# Patient Record
Sex: FEMALE | Race: WHITE | Employment: OTHER | URBAN - METROPOLITAN AREA
[De-identification: names, ages, dates, MRNs, and addresses within clinical notes are randomized per-mention and may not be internally consistent; named-entity substitution may affect disease eponyms.]

---

## 2018-01-01 ENCOUNTER — APPOINTMENT (OUTPATIENT)
Dept: ULTRASOUND IMAGING | Facility: HOSPITAL | Age: 68
DRG: 871 | End: 2018-01-01
Attending: PHYSICIAN ASSISTANT
Payer: MEDICARE

## 2018-01-01 ENCOUNTER — HOSPITAL ENCOUNTER (INPATIENT)
Facility: HOSPITAL | Age: 68
LOS: 3 days | Discharge: HOME | DRG: 687 | End: 2018-01-01
Attending: EMERGENCY MEDICINE | Admitting: INTERNAL MEDICINE
Payer: MEDICARE

## 2018-01-01 ENCOUNTER — HOSPITAL ENCOUNTER (INPATIENT)
Facility: HOSPITAL | Age: 68
LOS: 1 days | DRG: 871 | End: 2018-01-01
Attending: EMERGENCY MEDICINE | Admitting: INTERNAL MEDICINE
Payer: MEDICARE

## 2018-01-01 ENCOUNTER — APPOINTMENT (OUTPATIENT)
Dept: CT IMAGING | Facility: HOSPITAL | Age: 68
DRG: 871 | End: 2018-01-01
Attending: PHYSICIAN ASSISTANT
Payer: MEDICARE

## 2018-01-01 ENCOUNTER — APPOINTMENT (OUTPATIENT)
Dept: ULTRASOUND IMAGING | Facility: HOSPITAL | Age: 68
DRG: 687 | End: 2018-01-01
Attending: EMERGENCY MEDICINE
Payer: MEDICARE

## 2018-01-01 ENCOUNTER — APPOINTMENT (OUTPATIENT)
Dept: CT IMAGING | Facility: HOSPITAL | Age: 68
DRG: 687 | End: 2018-01-01
Attending: INTERNAL MEDICINE
Payer: MEDICARE

## 2018-01-01 ENCOUNTER — APPOINTMENT (OUTPATIENT)
Dept: CT IMAGING | Facility: HOSPITAL | Age: 68
DRG: 687 | End: 2018-01-01
Attending: EMERGENCY MEDICINE
Payer: MEDICARE

## 2018-01-01 ENCOUNTER — APPOINTMENT (OUTPATIENT)
Dept: HEMATOLOGY/ONCOLOGY | Facility: HOSPITAL | Age: 68
End: 2018-01-01
Attending: INTERNAL MEDICINE
Payer: MEDICARE

## 2018-01-01 ENCOUNTER — APPOINTMENT (OUTPATIENT)
Dept: INTERVENTIONAL RADIOLOGY/VASCULAR | Facility: HOSPITAL | Age: 68
DRG: 687 | End: 2018-01-01
Attending: CLINICAL NURSE SPECIALIST
Payer: MEDICARE

## 2018-01-01 ENCOUNTER — APPOINTMENT (OUTPATIENT)
Dept: GENERAL RADIOLOGY | Facility: HOSPITAL | Age: 68
DRG: 871 | End: 2018-01-01
Attending: EMERGENCY MEDICINE
Payer: MEDICARE

## 2018-01-01 ENCOUNTER — APPOINTMENT (OUTPATIENT)
Dept: GENERAL RADIOLOGY | Facility: HOSPITAL | Age: 68
DRG: 687 | End: 2018-01-01
Attending: EMERGENCY MEDICINE
Payer: MEDICARE

## 2018-01-01 ENCOUNTER — APPOINTMENT (OUTPATIENT)
Dept: CT IMAGING | Facility: HOSPITAL | Age: 68
DRG: 871 | End: 2018-01-01
Attending: EMERGENCY MEDICINE
Payer: MEDICARE

## 2018-01-01 VITALS — TEMPERATURE: 97 F | WEIGHT: 179.31 LBS | OXYGEN SATURATION: 95 % | BODY MASS INDEX: 31 KG/M2

## 2018-01-01 VITALS
HEIGHT: 64 IN | WEIGHT: 170 LBS | TEMPERATURE: 99 F | RESPIRATION RATE: 24 BRPM | BODY MASS INDEX: 29.02 KG/M2 | HEART RATE: 85 BPM | SYSTOLIC BLOOD PRESSURE: 145 MMHG | OXYGEN SATURATION: 92 % | DIASTOLIC BLOOD PRESSURE: 70 MMHG

## 2018-01-01 DIAGNOSIS — J18.9 HAP (HOSPITAL-ACQUIRED PNEUMONIA): ICD-10-CM

## 2018-01-01 DIAGNOSIS — D64.9 ANEMIA, UNSPECIFIED TYPE: ICD-10-CM

## 2018-01-01 DIAGNOSIS — R53.81 MALAISE: ICD-10-CM

## 2018-01-01 DIAGNOSIS — C64.2 RENAL CELL CARCINOMA OF LEFT KIDNEY METASTATIC TO OTHER SITE (HCC): Primary | ICD-10-CM

## 2018-01-01 DIAGNOSIS — E87.1 HYPONATREMIA: ICD-10-CM

## 2018-01-01 DIAGNOSIS — N30.01 ACUTE CYSTITIS WITH HEMATURIA: ICD-10-CM

## 2018-01-01 DIAGNOSIS — D72.829 LEUKOCYTOSIS, UNSPECIFIED TYPE: ICD-10-CM
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
## 2018-01-01 DIAGNOSIS — A41.9 SEPSIS, DUE TO UNSPECIFIED ORGANISM: Primary | ICD-10-CM

## 2018-01-01 DIAGNOSIS — N17.9 AKI (ACUTE KIDNEY INJURY) (HCC): ICD-10-CM

## 2018-01-01 DIAGNOSIS — E83.42 HYPOMAGNESEMIA: ICD-10-CM

## 2018-01-01 DIAGNOSIS — R59.0 SUPRACLAVICULAR LYMPHADENOPATHY: ICD-10-CM

## 2018-01-01 LAB
ALBUMIN SERPL BCP-MCNC: 1.5 G/DL (ref 3.5–4.8)
ALBUMIN SERPL BCP-MCNC: 1.8 G/DL (ref 3.5–4.8)
ALBUMIN SERPL BCP-MCNC: 2.1 G/DL (ref 3.5–4.8)
ALBUMIN SERPL BCP-MCNC: 2.3 G/DL (ref 3.5–4.8)
ALBUMIN SERPL BCP-MCNC: 2.8 G/DL (ref 3.5–4.8)
ALBUMIN/GLOB SERPL: 0.5 {RATIO} (ref 1–2)
ALP SERPL-CCNC: 162 U/L (ref 32–100)
ALP SERPL-CCNC: 174 U/L (ref 32–100)
ALP SERPL-CCNC: 188 U/L (ref 32–100)
ALP SERPL-CCNC: 645 U/L (ref 32–100)
ALP SERPL-CCNC: 708 U/L (ref 32–100)
ALT SERPL-CCNC: 132 U/L (ref 14–54)
ALT SERPL-CCNC: 620 U/L (ref 14–54)
ALT SERPL-CCNC: 708 U/L (ref 14–54)
ALT SERPL-CCNC: 78 U/L (ref 14–54)
ALT SERPL-CCNC: 94 U/L (ref 14–54)
AMMONIA PLAS-MCNC: 153 UG/DL (ref 19.5–64.6)
ANION GAP SERPL CALC-SCNC: 10 MMOL/L (ref 0–18)
ANION GAP SERPL CALC-SCNC: 10 MMOL/L (ref 0–18)
ANION GAP SERPL CALC-SCNC: 13 MMOL/L (ref 0–18)
ANION GAP SERPL CALC-SCNC: 15 MMOL/L (ref 0–18)
ANION GAP SERPL CALC-SCNC: 9 MMOL/L (ref 0–18)
APTT PPP: 31.7 SECONDS (ref 23.2–35.3)
AST SERPL-CCNC: 103 U/L (ref 15–41)
AST SERPL-CCNC: 1362 U/L (ref 15–41)
AST SERPL-CCNC: 151 U/L (ref 15–41)
AST SERPL-CCNC: 1716 U/L (ref 15–41)
AST SERPL-CCNC: 225 U/L (ref 15–41)
BASOPHILS # BLD: 0 K/UL (ref 0–0.2)
BASOPHILS # BLD: 0 K/UL (ref 0–0.2)
BASOPHILS # BLD: 0.1 K/UL (ref 0–0.2)
BASOPHILS NFR BLD: 0 %
BASOPHILS NFR BLD: 1 %
BILIRUB DIRECT SERPL-MCNC: 0.5 MG/DL (ref 0–0.2)
BILIRUB DIRECT SERPL-MCNC: 0.8 MG/DL (ref 0–0.2)
BILIRUB DIRECT SERPL-MCNC: 6.8 MG/DL (ref 0–0.2)
BILIRUB DIRECT SERPL-MCNC: 7.2 MG/DL (ref 0–0.2)
BILIRUB SERPL-MCNC: 1 MG/DL (ref 0.3–1.2)
BILIRUB SERPL-MCNC: 1.1 MG/DL (ref 0.3–1.2)
BILIRUB SERPL-MCNC: 1.4 MG/DL (ref 0.3–1.2)
BILIRUB SERPL-MCNC: 10.2 MG/DL (ref 0.3–1.2)
BILIRUB SERPL-MCNC: 11.2 MG/DL (ref 0.3–1.2)
BILIRUB UR QL: NEGATIVE
BILIRUB UR QL: POSITIVE
BUN SERPL-MCNC: 10 MG/DL (ref 8–20)
BUN SERPL-MCNC: 10 MG/DL (ref 8–20)
BUN SERPL-MCNC: 11 MG/DL (ref 8–20)
BUN SERPL-MCNC: 63 MG/DL (ref 8–20)
BUN SERPL-MCNC: 70 MG/DL (ref 8–20)
BUN/CREAT SERPL: 13 (ref 10–20)
BUN/CREAT SERPL: 14.3 (ref 10–20)
BUN/CREAT SERPL: 14.7 (ref 10–20)
BUN/CREAT SERPL: 34.4 (ref 10–20)
BUN/CREAT SERPL: 38.5 (ref 10–20)
CALCIUM SERPL-MCNC: 6.6 MG/DL (ref 8.5–10.5)
CALCIUM SERPL-MCNC: 7.1 MG/DL (ref 8.5–10.5)
CALCIUM SERPL-MCNC: 8 MG/DL (ref 8.5–10.5)
CALCIUM SERPL-MCNC: 8.2 MG/DL (ref 8.5–10.5)
CALCIUM SERPL-MCNC: 8.8 MG/DL (ref 8.5–10.5)
CHLORIDE SERPL-SCNC: 102 MMOL/L (ref 95–110)
CHLORIDE SERPL-SCNC: 107 MMOL/L (ref 95–110)
CHLORIDE SERPL-SCNC: 94 MMOL/L (ref 95–110)
CHLORIDE SERPL-SCNC: 95 MMOL/L (ref 95–110)
CHLORIDE SERPL-SCNC: 99 MMOL/L (ref 95–110)
CLARITY UR: CLEAR
CO2 SERPL-SCNC: 16 MMOL/L (ref 22–32)
CO2 SERPL-SCNC: 19 MMOL/L (ref 22–32)
CO2 SERPL-SCNC: 20 MMOL/L (ref 22–32)
CO2 SERPL-SCNC: 23 MMOL/L (ref 22–32)
CO2 SERPL-SCNC: 23 MMOL/L (ref 22–32)
COLOR UR: YELLOW
COLOR UR: YELLOW
CREAT SERPL-MCNC: 0.7 MG/DL (ref 0.5–1.5)
CREAT SERPL-MCNC: 0.75 MG/DL (ref 0.5–1.5)
CREAT SERPL-MCNC: 0.77 MG/DL (ref 0.5–1.5)
CREAT SERPL-MCNC: 1.82 MG/DL (ref 0.5–1.5)
CREAT SERPL-MCNC: 1.83 MG/DL (ref 0.5–1.5)
EOSINOPHIL # BLD: 0 K/UL (ref 0–0.7)
EOSINOPHIL # BLD: 0 K/UL (ref 0–0.7)
EOSINOPHIL # BLD: 0.1 K/UL (ref 0–0.7)
EOSINOPHIL NFR BLD: 0 %
EOSINOPHIL NFR BLD: 0 %
EOSINOPHIL NFR BLD: 1 %
ERYTHROCYTE [DISTWIDTH] IN BLOOD BY AUTOMATED COUNT: 15.5 % (ref 11–15)
ERYTHROCYTE [DISTWIDTH] IN BLOOD BY AUTOMATED COUNT: 15.6 % (ref 11–15)
ERYTHROCYTE [DISTWIDTH] IN BLOOD BY AUTOMATED COUNT: 15.9 % (ref 11–15)
ERYTHROCYTE [DISTWIDTH] IN BLOOD BY AUTOMATED COUNT: 17.7 % (ref 11–15)
ERYTHROCYTE [DISTWIDTH] IN BLOOD BY AUTOMATED COUNT: 20.1 % (ref 11–15)
GLOBULIN PLAS-MCNC: 4.3 G/DL (ref 2.5–3.7)
GLUCOSE SERPL-MCNC: 102 MG/DL (ref 70–99)
GLUCOSE SERPL-MCNC: 102 MG/DL (ref 70–99)
GLUCOSE SERPL-MCNC: 104 MG/DL (ref 70–99)
GLUCOSE SERPL-MCNC: 123 MG/DL (ref 70–99)
GLUCOSE SERPL-MCNC: 81 MG/DL (ref 70–99)
GLUCOSE UR-MCNC: NEGATIVE MG/DL
GLUCOSE UR-MCNC: NEGATIVE MG/DL
HAV IGM SERPL QL IA: NONREACTIVE
HBV CORE IGM SERPL QL IA: NONREACTIVE
HBV SURFACE AG SERPL QL IA: NONREACTIVE
HCT VFR BLD AUTO: 28.4 % (ref 35–48)
HCT VFR BLD AUTO: 29 % (ref 35–48)
HCT VFR BLD AUTO: 31.6 % (ref 35–48)
HCT VFR BLD AUTO: 33.3 % (ref 35–48)
HCT VFR BLD AUTO: 34.6 % (ref 35–48)
HCV AB SERPL QL IA: NONREACTIVE
HGB BLD-MCNC: 10.3 G/DL (ref 12–16)
HGB BLD-MCNC: 10.9 G/DL (ref 12–16)
HGB BLD-MCNC: 9.1 G/DL (ref 12–16)
HGB BLD-MCNC: 9.4 G/DL (ref 12–16)
HGB BLD-MCNC: 9.4 G/DL (ref 12–16)
HYALINE CASTS #/AREA URNS AUTO: 1 /LPF
INR BLD: 1.2 (ref 0.9–1.2)
INR BLD: 2.3 (ref 0.9–1.2)
IRON SATN MFR SERPL: 10 % (ref 15–50)
IRON SERPL-MCNC: 20 MCG/DL (ref 28–170)
KETONES UR-MCNC: NEGATIVE MG/DL
KETONES UR-MCNC: NEGATIVE MG/DL
LACTATE SERPL-SCNC: 1.8 MMOL/L (ref 0.5–2.2)
LACTATE SERPL-SCNC: 2.4 MMOL/L (ref 0.5–2.2)
LACTATE SERPL-SCNC: 5.3 MMOL/L (ref 0.5–2.2)
LEUKOCYTE ESTERASE UR QL STRIP.AUTO: NEGATIVE
LIPASE SERPL-CCNC: 14 U/L (ref 22–51)
LIPASE SERPL-CCNC: 20 U/L (ref 22–51)
LYMPHOCYTES # BLD: 0.8 K/UL (ref 1–4)
LYMPHOCYTES # BLD: 0.9 K/UL (ref 1–4)
LYMPHOCYTES # BLD: 2 K/UL (ref 1–4)
LYMPHOCYTES NFR BLD: 2 %
LYMPHOCYTES NFR BLD: 4 %
LYMPHOCYTES NFR BLD: 5 %
MAGNESIUM SERPL-MCNC: 1.7 MG/DL (ref 1.8–2.5)
MCH RBC QN AUTO: 24.4 PG (ref 27–32)
MCH RBC QN AUTO: 24.6 PG (ref 27–32)
MCH RBC QN AUTO: 24.7 PG (ref 27–32)
MCH RBC QN AUTO: 25.2 PG (ref 27–32)
MCH RBC QN AUTO: 25.4 PG (ref 27–32)
MCHC RBC AUTO-ENTMCNC: 28.1 G/DL (ref 32–37)
MCHC RBC AUTO-ENTMCNC: 31.7 G/DL (ref 32–37)
MCHC RBC AUTO-ENTMCNC: 32.2 G/DL (ref 32–37)
MCHC RBC AUTO-ENTMCNC: 32.3 G/DL (ref 32–37)
MCHC RBC AUTO-ENTMCNC: 32.5 G/DL (ref 32–37)
MCV RBC AUTO: 75.8 FL (ref 80–100)
MCV RBC AUTO: 76 FL (ref 80–100)
MCV RBC AUTO: 76.1 FL (ref 80–100)
MCV RBC AUTO: 80.3 FL (ref 80–100)
MCV RBC AUTO: 89.5 FL (ref 80–100)
METAMYELOCYTES # BLD MANUAL: 1.02 K/UL
METAMYELOCYTES # BLD MANUAL: 1.22 K/UL
METAMYELOCYTES # BLD MANUAL: 1.63 K/UL
METAMYELOCYTES # BLD MANUAL: 3.05 K/UL
METAMYELOCYTES NFR BLD: 3 %
METAMYELOCYTES NFR BLD: 6 %
MONOCYTES # BLD: 1.2 K/UL (ref 0–1)
MONOCYTES # BLD: 1.6 K/UL (ref 0–1)
MONOCYTES # BLD: 1.6 K/UL (ref 0–1)
MONOCYTES # BLD: 2 K/UL (ref 0–1)
MONOCYTES # BLD: 3.6 K/UL (ref 0–1)
MONOCYTES NFR BLD: 10 %
MONOCYTES NFR BLD: 11 %
MONOCYTES NFR BLD: 12 %
MONOCYTES NFR BLD: 3 %
MONOCYTES NFR BLD: 7 %
MYELOCYTES NFR BLD: 2 %
MYELOCYTES NFR BLD: 4 %
NEUTROPHILS # BLD AUTO: 12.5 K/UL (ref 1.8–7.7)
NEUTROPHILS # BLD AUTO: 14.1 K/UL (ref 1.8–7.7)
NEUTROPHILS # BLD AUTO: 14.3 K/UL (ref 1.8–7.7)
NEUTROPHILS # BLD AUTO: 35.8 K/UL (ref 1.8–7.7)
NEUTROPHILS # BLD AUTO: 41.2 K/UL (ref 1.8–7.7)
NEUTROPHILS NFR BLD: 66 %
NEUTROPHILS NFR BLD: 71 %
NEUTROPHILS NFR BLD: 83 %
NEUTROPHILS NFR BLD: 83 %
NEUTROPHILS NFR BLD: 84 %
NEUTS BAND NFR BLD: 15 %
NEUTS BAND NFR BLD: 17 %
NITRITE UR QL STRIP.AUTO: NEGATIVE
NITRITE UR QL STRIP.AUTO: NEGATIVE
NRBC BLD-RTO: 1 % (ref ?–1)
NRBC BLD-RTO: 1 % (ref ?–1)
OSMOLALITY UR CALC.SUM OF ELEC: 271 MOSM/KG (ref 275–295)
OSMOLALITY UR CALC.SUM OF ELEC: 272 MOSM/KG (ref 275–295)
OSMOLALITY UR CALC.SUM OF ELEC: 274 MOSM/KG (ref 275–295)
OSMOLALITY UR CALC.SUM OF ELEC: 284 MOSM/KG (ref 275–295)
OSMOLALITY UR CALC.SUM OF ELEC: 296 MOSM/KG (ref 275–295)
PH UR: 5 [PH] (ref 5–8)
PH UR: 6 [PH] (ref 5–8)
PLATELET # BLD AUTO: 129 K/UL (ref 140–400)
PLATELET # BLD AUTO: 370 K/UL (ref 140–400)
PLATELET # BLD AUTO: 385 K/UL (ref 140–400)
PLATELET # BLD AUTO: 451 K/UL (ref 140–400)
PLATELET # BLD AUTO: 83 K/UL (ref 140–400)
PMV BLD AUTO: 7.4 FL (ref 7.4–10.3)
PMV BLD AUTO: 7.5 FL (ref 7.4–10.3)
PMV BLD AUTO: 7.9 FL (ref 7.4–10.3)
PMV BLD AUTO: 8.7 FL (ref 7.4–10.3)
PMV BLD AUTO: 8.9 FL (ref 7.4–10.3)
POTASSIUM SERPL-SCNC: 4 MMOL/L (ref 3.3–5.1)
POTASSIUM SERPL-SCNC: 4.1 MMOL/L (ref 3.3–5.1)
POTASSIUM SERPL-SCNC: 4.3 MMOL/L (ref 3.3–5.1)
POTASSIUM SERPL-SCNC: 5.2 MMOL/L (ref 3.3–5.1)
POTASSIUM SERPL-SCNC: 5.3 MMOL/L (ref 3.3–5.1)
PROCALCITONIN SERPL-MCNC: 1.99 NG/ML (ref ?–0.11)
PROT SERPL-MCNC: 5.3 G/DL (ref 5.9–8.4)
PROT SERPL-MCNC: 6.4 G/DL (ref 5.9–8.4)
PROT SERPL-MCNC: 6.6 G/DL (ref 5.9–8.4)
PROT SERPL-MCNC: 6.6 G/DL (ref 5.9–8.4)
PROT SERPL-MCNC: 7.4 G/DL (ref 5.9–8.4)
PROT UR-MCNC: 100 MG/DL
PROT UR-MCNC: 30 MG/DL
PROTHROMBIN TIME: 15.2 SECONDS (ref 11.8–14.5)
PROTHROMBIN TIME: 24.5 SECONDS (ref 11.8–14.5)
RBC # BLD AUTO: 3.72 M/UL (ref 3.7–5.4)
RBC # BLD AUTO: 3.75 M/UL (ref 3.7–5.4)
RBC # BLD AUTO: 3.81 M/UL (ref 3.7–5.4)
RBC # BLD AUTO: 4.16 M/UL (ref 3.7–5.4)
RBC # BLD AUTO: 4.31 M/UL (ref 3.7–5.4)
RBC #/AREA URNS AUTO: 102 /HPF
RBC #/AREA URNS AUTO: 4 /HPF
SODIUM SERPL-SCNC: 128 MMOL/L (ref 136–144)
SODIUM SERPL-SCNC: 131 MMOL/L (ref 136–144)
SODIUM SERPL-SCNC: 131 MMOL/L (ref 136–144)
SODIUM SERPL-SCNC: 132 MMOL/L (ref 136–144)
SODIUM SERPL-SCNC: 133 MMOL/L (ref 136–144)
SP GR UR STRIP: 1.01 (ref 1–1.03)
SP GR UR STRIP: 1.02 (ref 1–1.03)
TIBC SERPL-MCNC: 191 MCG/DL (ref 228–428)
TRANSFERRIN SERPL-MCNC: 145 MG/DL (ref 192–382)
TROPONIN I SERPL-MCNC: 0.02 NG/ML (ref ?–0.03)
UROBILINOGEN UR STRIP-ACNC: 4
UROBILINOGEN UR STRIP-ACNC: 4
VIT C UR-MCNC: NEGATIVE MG/DL
VIT C UR-MCNC: NEGATIVE MG/DL
WBC # BLD AUTO: 15.1 K/UL (ref 4–11)
WBC # BLD AUTO: 16.9 K/UL (ref 4–11)
WBC # BLD AUTO: 17.3 K/UL (ref 4–11)
WBC # BLD AUTO: 40.7 K/UL (ref 4–11)
WBC # BLD AUTO: 50.9 K/UL (ref 4–11)
WBC #/AREA URNS AUTO: 3 /HPF
WBC #/AREA URNS AUTO: 5130 /HPF

## 2018-01-01 PROCEDURE — 99223 1ST HOSP IP/OBS HIGH 75: CPT | Performed by: INTERNAL MEDICINE

## 2018-01-01 PROCEDURE — 71250 CT THORAX DX C-: CPT | Performed by: INTERNAL MEDICINE

## 2018-01-01 PROCEDURE — 99232 SBSQ HOSP IP/OBS MODERATE 35: CPT | Performed by: NURSE PRACTITIONER

## 2018-01-01 PROCEDURE — 99232 SBSQ HOSP IP/OBS MODERATE 35: CPT | Performed by: INTERNAL MEDICINE

## 2018-01-01 PROCEDURE — 74176 CT ABD & PELVIS W/O CONTRAST: CPT | Performed by: PHYSICIAN ASSISTANT

## 2018-01-01 PROCEDURE — 71045 X-RAY EXAM CHEST 1 VIEW: CPT | Performed by: EMERGENCY MEDICINE

## 2018-01-01 PROCEDURE — 70450 CT HEAD/BRAIN W/O DYE: CPT | Performed by: EMERGENCY MEDICINE

## 2018-01-01 PROCEDURE — 99233 SBSQ HOSP IP/OBS HIGH 50: CPT | Performed by: INTERNAL MEDICINE

## 2018-01-01 PROCEDURE — 76705 ECHO EXAM OF ABDOMEN: CPT | Performed by: EMERGENCY MEDICINE

## 2018-01-01 PROCEDURE — 76705 ECHO EXAM OF ABDOMEN: CPT | Performed by: PHYSICIAN ASSISTANT

## 2018-01-01 PROCEDURE — 74177 CT ABD & PELVIS W/CONTRAST: CPT | Performed by: EMERGENCY MEDICINE

## 2018-01-01 PROCEDURE — 99221 1ST HOSP IP/OBS SF/LOW 40: CPT | Performed by: NURSE PRACTITIONER

## 2018-01-01 PROCEDURE — 02HV33Z INSERTION OF INFUSION DEVICE INTO SUPERIOR VENA CAVA, PERCUTANEOUS APPROACH: ICD-10-PCS | Performed by: INTERNAL MEDICINE

## 2018-01-01 PROCEDURE — 99223 1ST HOSP IP/OBS HIGH 75: CPT | Performed by: REGISTERED NURSE

## 2018-01-01 PROCEDURE — 0FB13ZX EXCISION OF RIGHT LOBE LIVER, PERCUTANEOUS APPROACH, DIAGNOSTIC: ICD-10-PCS | Performed by: RADIOLOGY

## 2018-01-01 RX ORDER — ONDANSETRON 2 MG/ML
8 INJECTION INTRAMUSCULAR; INTRAVENOUS EVERY 6 HOURS SCHEDULED
Status: COMPLETED | OUTPATIENT
Start: 2018-01-01 | End: 2018-01-01

## 2018-01-01 RX ORDER — ONDANSETRON 2 MG/ML
4 INJECTION INTRAMUSCULAR; INTRAVENOUS EVERY 4 HOURS PRN
Status: DISCONTINUED | OUTPATIENT
Start: 2018-01-01 | End: 2018-01-01

## 2018-01-01 RX ORDER — MORPHINE SULFATE 20 MG/ML
5 SOLUTION ORAL EVERY 4 HOURS PRN
Status: DISCONTINUED | OUTPATIENT
Start: 2018-01-01 | End: 2018-01-01

## 2018-01-01 RX ORDER — MAGNESIUM SULFATE 1 G/100ML
1 INJECTION INTRAVENOUS ONCE
Status: COMPLETED | OUTPATIENT
Start: 2018-01-01 | End: 2018-01-01

## 2018-01-01 RX ORDER — LISINOPRIL 10 MG/1
10 TABLET ORAL DAILY
Status: ON HOLD | COMMUNITY
End: 2018-01-01

## 2018-01-01 RX ORDER — ONDANSETRON 8 MG/1
8 TABLET, ORALLY DISINTEGRATING ORAL EVERY 6 HOURS PRN
Status: DISCONTINUED | OUTPATIENT
Start: 2018-01-01 | End: 2018-01-01

## 2018-01-01 RX ORDER — MORPHINE SULFATE 2 MG/ML
0.5 INJECTION, SOLUTION INTRAMUSCULAR; INTRAVENOUS ONCE
Status: COMPLETED | OUTPATIENT
Start: 2018-01-01 | End: 2018-01-01

## 2018-01-01 RX ORDER — ONDANSETRON 4 MG/1
4 TABLET, ORALLY DISINTEGRATING ORAL EVERY 8 HOURS PRN
Status: DISCONTINUED | OUTPATIENT
Start: 2018-01-01 | End: 2018-01-01

## 2018-01-01 RX ORDER — LISINOPRIL 10 MG/1
10 TABLET ORAL 2 TIMES DAILY
Status: DISCONTINUED | OUTPATIENT
Start: 2018-01-01 | End: 2018-01-01

## 2018-01-01 RX ORDER — PRAVASTATIN SODIUM 20 MG
40 TABLET ORAL NIGHTLY
Status: DISCONTINUED | OUTPATIENT
Start: 2018-01-01 | End: 2018-01-01 | Stop reason: RX

## 2018-01-01 RX ORDER — CLONAZEPAM 0.5 MG/1
0.5 TABLET ORAL 2 TIMES DAILY PRN
Status: DISCONTINUED | OUTPATIENT
Start: 2018-01-01 | End: 2018-01-01

## 2018-01-01 RX ORDER — SENNA AND DOCUSATE SODIUM 50; 8.6 MG/1; MG/1
2 TABLET, FILM COATED ORAL ONCE
Status: COMPLETED | OUTPATIENT
Start: 2018-01-01 | End: 2018-01-01

## 2018-01-01 RX ORDER — SENNA AND DOCUSATE SODIUM 50; 8.6 MG/1; MG/1
2 TABLET, FILM COATED ORAL 2 TIMES DAILY
Status: DISCONTINUED | OUTPATIENT
Start: 2018-01-01 | End: 2018-01-01

## 2018-01-01 RX ORDER — LACTULOSE 10 G/15ML
20 SOLUTION ORAL 3 TIMES DAILY
Status: DISCONTINUED | OUTPATIENT
Start: 2018-01-01 | End: 2018-01-01

## 2018-01-01 RX ORDER — KETOROLAC TROMETHAMINE 30 MG/ML
15 INJECTION, SOLUTION INTRAMUSCULAR; INTRAVENOUS ONCE
Status: COMPLETED | OUTPATIENT
Start: 2018-01-01 | End: 2018-01-01

## 2018-01-01 RX ORDER — FLUOXETINE HYDROCHLORIDE 40 MG/1
40 CAPSULE ORAL DAILY
COMMUNITY

## 2018-01-01 RX ORDER — ONDANSETRON 2 MG/ML
4 INJECTION INTRAMUSCULAR; INTRAVENOUS ONCE
Status: COMPLETED | OUTPATIENT
Start: 2018-01-01 | End: 2018-01-01

## 2018-01-01 RX ORDER — MORPHINE SULFATE 20 MG/ML
10 SOLUTION ORAL EVERY 4 HOURS PRN
Status: DISCONTINUED | OUTPATIENT
Start: 2018-01-01 | End: 2018-01-01

## 2018-01-01 RX ORDER — ONDANSETRON 4 MG/1
4 TABLET, ORALLY DISINTEGRATING ORAL EVERY 8 HOURS PRN
COMMUNITY
End: 2018-01-01

## 2018-01-01 RX ORDER — FLUOXETINE HYDROCHLORIDE 20 MG/1
40 CAPSULE ORAL DAILY
Status: DISCONTINUED | OUTPATIENT
Start: 2018-01-01 | End: 2018-01-01

## 2018-01-01 RX ORDER — 0.9 % SODIUM CHLORIDE 0.9 %
VIAL (ML) INJECTION
Status: COMPLETED
Start: 2018-01-01 | End: 2018-01-01

## 2018-01-01 RX ORDER — ACETAMINOPHEN 325 MG/1
650 TABLET ORAL EVERY 6 HOURS PRN
Status: DISCONTINUED | OUTPATIENT
Start: 2018-01-01 | End: 2018-01-01

## 2018-01-01 RX ORDER — KETOROLAC TROMETHAMINE 15 MG/ML
15 INJECTION, SOLUTION INTRAMUSCULAR; INTRAVENOUS EVERY 6 HOURS PRN
Status: DISPENSED | OUTPATIENT
Start: 2018-01-01 | End: 2018-01-01

## 2018-01-01 RX ORDER — MORPHINE SULFATE 2 MG/ML
2 INJECTION, SOLUTION INTRAMUSCULAR; INTRAVENOUS
Status: DISCONTINUED | OUTPATIENT
Start: 2018-01-01 | End: 2018-01-01

## 2018-01-01 RX ORDER — SENNA AND DOCUSATE SODIUM 50; 8.6 MG/1; MG/1
4 TABLET, FILM COATED ORAL 2 TIMES DAILY
Status: DISCONTINUED | OUTPATIENT
Start: 2018-01-01 | End: 2018-01-01

## 2018-01-01 RX ORDER — ONDANSETRON 8 MG/1
8 TABLET, ORALLY DISINTEGRATING ORAL EVERY 6 HOURS PRN
Qty: 30 TABLET | Refills: 0 | Status: SHIPPED | OUTPATIENT
Start: 2018-01-01

## 2018-01-01 RX ORDER — HYDRALAZINE HYDROCHLORIDE 20 MG/ML
10 INJECTION INTRAMUSCULAR; INTRAVENOUS EVERY 6 HOURS PRN
Status: DISCONTINUED | OUTPATIENT
Start: 2018-01-01 | End: 2018-01-01

## 2018-01-01 RX ORDER — CLONAZEPAM 0.5 MG/1
0.5 TABLET ORAL 2 TIMES DAILY PRN
COMMUNITY

## 2018-01-01 RX ORDER — LISINOPRIL 10 MG/1
10 TABLET ORAL DAILY
Status: DISCONTINUED | OUTPATIENT
Start: 2018-01-01 | End: 2018-01-01

## 2018-01-01 RX ORDER — ACETAMINOPHEN 500 MG
1000 TABLET ORAL ONCE
Status: COMPLETED | OUTPATIENT
Start: 2018-01-01 | End: 2018-01-01

## 2018-01-01 RX ORDER — PANTOPRAZOLE SODIUM 40 MG/1
40 TABLET, DELAYED RELEASE ORAL
Status: DISCONTINUED | OUTPATIENT
Start: 2018-01-01 | End: 2018-01-01

## 2018-01-01 RX ORDER — SENNA AND DOCUSATE SODIUM 50; 8.6 MG/1; MG/1
2 TABLET, FILM COATED ORAL
Status: DISCONTINUED | OUTPATIENT
Start: 2018-01-01 | End: 2018-01-01

## 2018-01-01 RX ORDER — MORPHINE SULFATE 2 MG/ML
1 INJECTION, SOLUTION INTRAMUSCULAR; INTRAVENOUS EVERY 2 HOUR PRN
Status: DISCONTINUED | OUTPATIENT
Start: 2018-01-01 | End: 2018-01-01

## 2018-01-01 RX ORDER — MORPHINE SULFATE 20 MG/ML
10 SOLUTION ORAL EVERY 4 HOURS PRN
Qty: 30 ML | Refills: 0 | Status: SHIPPED | OUTPATIENT
Start: 2018-01-01 | End: 2018-02-25

## 2018-01-01 RX ORDER — SENNA AND DOCUSATE SODIUM 50; 8.6 MG/1; MG/1
4 TABLET, FILM COATED ORAL 2 TIMES DAILY
Qty: 120 TABLET | Refills: 0 | Status: SHIPPED | OUTPATIENT
Start: 2018-01-01

## 2018-01-01 RX ORDER — MORPHINE SULFATE 2 MG/ML
0.5 INJECTION, SOLUTION INTRAMUSCULAR; INTRAVENOUS EVERY 2 HOUR PRN
Status: DISCONTINUED | OUTPATIENT
Start: 2018-01-01 | End: 2018-01-01

## 2018-01-01 RX ORDER — SODIUM CHLORIDE 9 MG/ML
INJECTION, SOLUTION INTRAVENOUS CONTINUOUS
Status: DISCONTINUED | OUTPATIENT
Start: 2018-01-01 | End: 2018-01-01

## 2018-01-01 RX ORDER — MIDAZOLAM HYDROCHLORIDE 1 MG/ML
INJECTION INTRAMUSCULAR; INTRAVENOUS
Status: COMPLETED
Start: 2018-01-01 | End: 2018-01-01

## 2018-01-01 RX ORDER — TRAZODONE HYDROCHLORIDE 100 MG/1
50 TABLET ORAL NIGHTLY PRN
COMMUNITY
End: 2018-01-01

## 2018-01-01 RX ORDER — SODIUM CHLORIDE 9 MG/ML
INJECTION, SOLUTION INTRAVENOUS
Status: COMPLETED
Start: 2018-01-01 | End: 2018-01-01

## 2018-01-01 RX ORDER — SODIUM CHLORIDE 0.9 % (FLUSH) 0.9 %
10 SYRINGE (ML) INJECTION AS NEEDED
Status: DISCONTINUED | OUTPATIENT
Start: 2018-01-01 | End: 2018-01-01

## 2018-01-01 RX ORDER — METRONIDAZOLE 500 MG/100ML
500 INJECTION, SOLUTION INTRAVENOUS EVERY 8 HOURS
Status: DISCONTINUED | OUTPATIENT
Start: 2018-01-01 | End: 2018-01-01

## 2018-01-01 RX ORDER — LIDOCAINE HYDROCHLORIDE 10 MG/ML
0.5 INJECTION, SOLUTION INFILTRATION; PERINEURAL ONCE AS NEEDED
Status: ACTIVE | OUTPATIENT
Start: 2018-01-01 | End: 2018-01-01

## 2018-01-01 RX ORDER — FAMOTIDINE 20 MG/1
20 TABLET ORAL 2 TIMES DAILY
COMMUNITY

## 2018-01-01 RX ORDER — LEVOTHYROXINE SODIUM 88 UG/1
88 TABLET ORAL
Status: DISCONTINUED | OUTPATIENT
Start: 2018-01-01 | End: 2018-01-01

## 2018-01-01 RX ORDER — ATORVASTATIN CALCIUM 10 MG/1
10 TABLET, FILM COATED ORAL NIGHTLY
Status: DISCONTINUED | OUTPATIENT
Start: 2018-01-01 | End: 2018-01-01

## 2018-01-01 RX ORDER — DEXTROSE AND SODIUM CHLORIDE 5; .45 G/100ML; G/100ML
INJECTION, SOLUTION INTRAVENOUS CONTINUOUS
Status: DISCONTINUED | OUTPATIENT
Start: 2018-01-01 | End: 2018-01-01

## 2018-01-01 RX ORDER — LEVOTHYROXINE SODIUM 88 UG/1
88 TABLET ORAL
COMMUNITY

## 2018-01-01 RX ORDER — TRAZODONE HYDROCHLORIDE 50 MG/1
50 TABLET ORAL NIGHTLY PRN
Status: DISCONTINUED | OUTPATIENT
Start: 2018-01-01 | End: 2018-01-01

## 2018-01-01 RX ORDER — ONDANSETRON 2 MG/ML
INJECTION INTRAMUSCULAR; INTRAVENOUS
Status: COMPLETED
Start: 2018-01-01 | End: 2018-01-01

## 2018-01-01 RX ORDER — BISACODYL 10 MG
10 SUPPOSITORY, RECTAL RECTAL
Status: DISCONTINUED | OUTPATIENT
Start: 2018-01-01 | End: 2018-01-01

## 2018-01-01 RX ORDER — MELATONIN
2000 DAILY
COMMUNITY

## 2018-01-01 RX ORDER — LISINOPRIL 10 MG/1
10 TABLET ORAL 2 TIMES DAILY
Qty: 60 TABLET | Refills: 0 | Status: SHIPPED | OUTPATIENT
Start: 2018-01-01

## 2018-01-01 RX ORDER — MORPHINE SULFATE 2 MG/ML
INJECTION, SOLUTION INTRAMUSCULAR; INTRAVENOUS
Status: COMPLETED
Start: 2018-01-01 | End: 2018-01-01

## 2018-01-01 RX ORDER — PRAVASTATIN SODIUM 40 MG
40 TABLET ORAL NIGHTLY
COMMUNITY

## 2018-01-23 PROBLEM — E83.42 HYPOMAGNESEMIA: Status: ACTIVE | Noted: 2018-01-01

## 2018-01-23 PROBLEM — C64.9 METASTATIC RENAL CELL CARCINOMA (HCC): Status: ACTIVE | Noted: 2018-01-01

## 2018-01-23 PROBLEM — R59.0 SUPRACLAVICULAR LYMPHADENOPATHY: Status: ACTIVE | Noted: 2018-01-01

## 2018-01-23 PROBLEM — C64.2: Status: ACTIVE | Noted: 2018-01-01

## 2018-01-23 PROBLEM — D72.829 LEUKOCYTOSIS, UNSPECIFIED TYPE: Status: ACTIVE | Noted: 2018-01-01

## 2018-01-23 NOTE — ED INITIAL ASSESSMENT (HPI)
C/o R upper abdominal pain with nausea and 1 episode of vomiting since yesterday evening. States she was sick with the flu the past 2-3 weeks, completed course of Tamiflu. Mask given at registration.

## 2018-01-23 NOTE — PLAN OF CARE
Anxiety    • Anxiety is at manageable level Progressing        Pain    • Patient's pain/discomfort is manageable Progressing        Patient Centered Care    • Patient preferences are identified and integrated in the patient's plan of care Progressing

## 2018-01-23 NOTE — ED PROVIDER NOTES
Patient Seen in: [facility] AND [facility] Emergency Department    History   Patient presents with:  Nausea/vomiting    Stated Complaint:     HPI    53-year-old female presents for complaint of right upper quadrant abdominal pain, nausea, vomiting.   Patient's sy Triage Vitals [01/23/18 0753]  BP: 134/70  Pulse: 94  Resp: 16  Temp: (!) 97.4 °F (36.3 °C)  Temp src: Temporal  SpO2: 97 %  O2 Device: None (Room air)    Current:/80   Pulse 80   Temp (!) 97.4 °F (36.3 °C) (Temporal)   Resp 20   Ht 162.6 cm (5' 4\") CBC W/ DIFFERENTIAL - Abnormal; Notable for the following:     WBC 16.9 (*)     HGB 10.3 (*)     HCT 31.6 (*)     MCV 76.0 (*)     MCH 24.7 (*)     RDW 15.5 (*)      (*)     Neutrophil Absolute 14.1 (*)     Lymphocyte Absolute 0.9 (*)     Monocyte small cocci within gallbladder lumen. No gallbladder wall thickening, luminal dilation, or pericholecystic fluid. Sonographic Miranda's is negative. BILE DUCTS:   Normal.  Common bile duct measures 3-4 mm.   OTHER:   Survey images of the right kidney and pan (CST): Malika Beck MD on 1/23/2018 at 9:22          CONCLUSION:  1. Normal heart and lungs. 2. Elevated right hemidiaphragm. 3. Tortuous aorta. 4. Demineralization. 5. Scoliosis.          Ct Abdomen Pelvis Iv Contrast, No Oral (er)    Result Date: 1/2 The mass measures approximately 15 x 11 x 12 cm (craniocaudal by transverse by AP). The components that extend extrarenally anteriorly and inferiorly with about loops of left upper quadrant small bowel as well as the descending colon.  There is a nonobstruc Notably, extrarenal components abut loops of small bowel in the left upper quadrant as well as the descending colon. No evidence of bowel obstruction. 2. Extensive left greater than right para-aortic retroperitoneal lymphadenopathy, likely metastatic. 3.  E

## 2018-01-23 NOTE — PROGRESS NOTES
Therapeutic Substitution Note    Pravastatin 40mg is non-formulary and was auto-substituted to Atorvastatin 10mg per [employer] Policy.     Dougie Mcintosh, 01/23/18, 4:14 PM

## 2018-01-23 NOTE — CONSULTS
[facility]    Report of Consultation    Carlyle Rodriguez Patient Status:  Inpatient    1950 MRN [id number]   Location [facility] 4W/SW/SE Attending [address], [address] [facility] Day # 0 PCP Tamy Chino MD, EDWARD     Date of Admiss before breakfast.   lisinopril 10 MG Oral Tab Take 10 mg by mouth daily. Pravastatin Sodium 40 MG Oral Tab Take 40 mg by mouth nightly. TraZODone HCl 100 MG Oral Tab Take 50 mg by mouth nightly as needed for Sleep.      Vitamin D3 1000 units Oral Tab Ta acute cholecystitis. No biliary ductal dilation. 2. Probable fatty liver. Scattered round/ovoid hypoechoic foci in the left and right hepatic lobes as detailed. These are incompletely characterized.  Differential considerations include foci of focal fatty s lymphadenopathy.  ([facility] [address]  Tissue diagnosis could be obtained through CT/US guided renal, liver or LN biopsy. As per Oncology. Nausea is well-controlled with Zofran. Will begin PPI and stop H2RI. Diet as tolerated. Pain management as per Oncology.

## 2018-01-24 PROBLEM — R52 PAIN: Status: ACTIVE | Noted: 2018-01-01

## 2018-01-24 PROBLEM — Z71.89 GOALS OF CARE, COUNSELING/DISCUSSION: Status: ACTIVE | Noted: 2018-01-01

## 2018-01-24 PROBLEM — Z71.89 ADVANCED CARE PLANNING/COUNSELING DISCUSSION: Status: ACTIVE | Noted: 2018-01-01

## 2018-01-24 NOTE — CONSULTS
[facility]      IDENTIFYING DATA  Patient is Mena Gaucher a 74year old female.  MRN is [id number]    Admitting physician: [address]  Primary care physician: Grisel Solis MD, [address] [place] [address]  Patient presents with:  Nausea/ excessive thirst, (-) feeling too hot/cold (-)  tired/sluggish  Gastrointestinal:(-)  abdominal pain   (-) nausea/vomiting (-) indigestion  Cardiovascular: (-) chest pain, (-) varicose veins (-) HTN  Integumentary: (-)  skin rash (-)  boils (-) persistent vomiting. Workup reveals likely renal cell carcinoma with diffuse mets. Agree with IR biopsy today and with Oncology evaluation. She is not a surgical candidate at this time. Will defer to Oncology for decision on systemic chemotherapy.     Thank you fo

## 2018-01-24 NOTE — CONSULTS
[address] [facility] Patient Status:  Inpatient    1950 MRN [id number]   Location [facility] 4W/SW/SE Attending [address], 768 [facility] Day # 1 PCP Judi Carrasquillo MD, [address]     Date of the patient. Patient seen with no family at the bedside. Leela Lowery initially asked to speak with me later due to pain. However, DUC Gilbert advised that Leela Lowery had gotten her last dose of Toradol IV at 6:00 a.m and this medication is prescribed PRN Q 6 hours. discussed the differences between palliative care and hospice. Palliative care brochure provided. We talked about Sandra's current clinical condition. Biopsy is planned for later today.     Lara Dan did not complete [facility] paperwork in the past. I provided a ondansetron (ZOFRAN-ODT) disintegrating tab 4 mg, 4 mg, Oral, Q8H PRN  •  TraZODone HCl (DESYREL) tab 50 mg, 50 mg, Oral, Nightly PRN  •  Cholecalciferol (VITAMIN D) 1000 units tab 2,000 Units, 2,000 Units, Oral, Daily  •  atorvastatin (LIPITOR) tab 10 mg, bilobar hepatic metastases are also partially imaged. 6. Mild emphysema. 7. Small hiatal hernia. Findings that can be seen with gastroesophageal reflux disease. 8. Lesser incidental findings as above.          Us Gallbladder (cpt=76705)    Result Date: 1/23 pelvis, nonspecific. 10. Lesser incidental findings as above. Results of this examination were discussed with the patient's physician, Dr. Blair Dove, by Dr. Hunter Donahue at [phone] on 1/20/20/80.          Objective:  Vital Signs:  Blood pressure 160/74, pulse 91, dose Morphine 0.5 mg IVP for severe pain  -Start Roxanol 5 mg PO Q 4 hours PRN pain  -Pt has elevated liver enzymes - recommend avoiding medications containing APAP      Constipation  -Start Senokot-S 2 tabs daily PRN constipation  -Continue Dulcolax suppo

## 2018-01-24 NOTE — CONSULTS
[facility]    PATIENT'S NAME: Roberta Hernandez   ATTENDING PHYSICIAN: Kolby Chau MD   CONSULTING PHYSICIAN: Sherry Bradley.  MD Daya   PATIENT ACCOUNT#:   [id number]    LOCATION:  [address] [place] [address] RECORD #:   [id number]       DATE OF BIRTH: pack a day for more than 30 years. She denies any alcohol or illicit drug use. FAMILY HISTORY:  There is a history of what she states is bowel cancer in a maternal uncle. REVIEW OF SYSTEMS:  All other systems are reviewed and are negative x12. participate in the care of this delightful patient. Dictated By Vinh Chadwick MD  d: 01/23/2018 17:41:50  t: 01/23/2018 18:19:10  [facility] [id number]  [facility]/    cc: Isis Ocasio MD

## 2018-01-24 NOTE — PROGRESS NOTES
[facility]    Progress Note    Nancie Cardenas Patient Status:  Inpatient    1950 MRN [id number]   Location [facility] Attending [address], [address] Day # 1 PCP [facility] MD, [facility] [address] [facility] LEVOTHROID) tab 88 mcg 88 mcg Oral Before breakfast   lisinopril (PRINIVIL,ZESTRIL) tab 10 mg 10 mg Oral Daily   ondansetron (ZOFRAN-ODT) disintegrating tab 4 mg 4 mg Oral Q8H PRN   TraZODone HCl (DESYREL) tab 50 mg 50 mg Oral Nightly PRN   Cholecalciferol Findings that can be seen with gastroesophageal reflux disease. 8. Lesser incidental findings as above. Us Gallbladder (cpt=76705)    Result Date: 1/23/2018  CONCLUSION:  1. Cholelithiasis.  No additional sonographic manifestations of acute cholecys were discussed with the patient's physician, Dr. Crispin Roger, by Dr. Ashanti Naranjo at [phone] on 1/20/20/80.        Ekg 12-lead    Result Date: 1/23/2018  ECG Report  Interpretation  -------------------------- Sinus Rhythm -Short MI syndrome Bernice = 106 BORDERLINE RHYTHM

## 2018-01-24 NOTE — CM/SW NOTE
CTL  Rounds: Met with pt at bedside. Pt resides in a single level home with her daughter Otis Peterson. Prior to admission pt was independent with ADL's including driving, medication management and shopping. Pt states that \"now everything may change\".  Pt is up

## 2018-01-24 NOTE — PROGRESS NOTES
[facility]    Hematology/Oncology   Progress Note    Nancie Cardenas Patient Status:  Inpatient    1950 MRN [id number]   Location [facility] 4W/SW/SE Attending [address], 8 [facility] Day # 1 PCP [facility] MD, Tarsha Dixon suggested. 4. Suspicious mixed lytic and sclerotic appearance of the T8 vertebral body, concerning for osseous metastatic disease.  If there are referable symptoms, consider followup thoracic spine MR without and with contrast. 5. Partially imaged heterogen suspicious for pulmonary metastatic disease. 5. Enlarged and heterogeneously enhancing right cardiophrenic lymph node, likely metastatic.  6. Suspicious mixed lytic and sclerotic appearance of the T8 vertebral body, concerning for osseous metastatic disease

## 2018-01-24 NOTE — PROCEDURES
[facility]  Procedure Note    Ron Seals Patient Status:  Inpatient    1950 MRN [id number]   Location [facility] Attending [address], [address] Day # 1 PCP [facility] MD, Sundar Fall     Procedure:

## 2018-01-24 NOTE — H&P
[facility]    PATIENT'S NAME: Maxi Holes   ATTENDING PHYSICIAN: Darlin Angelucci, MD   PATIENT ACCOUNT#:   [de-identified]    LOCATION:  [address] [place] [address] RECORD #:   [id number]       YOB: 1950  ADMISSION DATE:       01/23/ vomiting. Start her on Zofran p.r.n.    2.   Infectious disease. Afebrile. We will hold off antibiotics, since the patient is afebrile.   Could be due to the renal cell CA, then an abnormal CT of the abdomen which showed a renal cell carcinoma of the lef

## 2018-01-25 NOTE — DIETARY NOTE
ADULT NUTRITION INITIAL ASSESSMENT    Pt is at moderate nutrition risk. Pt does not meet malnutrition criteria.       RECOMMENDATIONS TO MD:  See nutrition intervention     NUTRITION DIAGNOSIS/PROBLEM:  Inadequate oral intake related to decreased ability t reviewed    NUTRITION RELATED PHYSICAL FINDINGS:  - Body Fat/Muscle Mass: well nourished per visual exam.  - Fluid Accumulation: none per visual exam  - Skin Integrity: intact and RN documentation reviewed.   - Ashvin score 22    NUTRITION PRESCRIPTION:  Aj Archibald

## 2018-01-25 NOTE — CONSULTS
[place] [facility]  Palliative Care Follow Up    Keri Story Patient Status:  Inpatient    1950 MRN [id number]   Location [facility] 4W/SW/SE Attending [address], [address] Day # 2 PCP Rebeca Rubio MD, EDWARD     Date of Consu medications versus PO medications. I again provided education about the potential side effects of morphine including sedation, dizziness, and nausea. I explained that these side effects may lessen over time.  I also explained that morphine causes constipati acute distress  Nutritional status: normally nourished  HEENT: no focal deficits  Chest appearance: normal  Cardiac: deferred  Lungs: Normal excursions and effort.   Abdomen: deferred  : deferred  General color: normal  Neurologic: level of consciousness- Results  Component Value Date   INR 1.2 01/24/2018       Chemistry:    Lab Results  Component Value Date   CREATSERUM 0.77 01/25/2018   BUN 10 01/25/2018    (L) 01/25/2018   K 4.3 01/25/2018   CL 99 01/25/2018   CO2 23 01/25/2018    (H) 01/25/ left greater than right para-aortic retroperitoneal lymphadenopathy, likely metastatic. 3. Extensive bilobar hepatic metastatic disease. 4. Small noncalcified lung nodules in the visualized lung bases, suspicious for pulmonary metastatic disease.  5. Enlarg better controlled and transition to PO as soon as possible  -Pt has elevated liver enzymes - recommend avoiding medications containing APAP      Constipation  -Start Senokot-S 2 tabs BID  -Continue Dulcolax suppository FL daily PRN constipation      Nausea

## 2018-01-25 NOTE — PROGRESS NOTES
[facility]    Progress Note    Carlyle Rodriguez Patient Status:  Inpatient    1950 MRN [id number]   Location [facility] Attending [address], [address] Day # 2 PCP [facility] MD, [facility] [address] [facility] disintegrating tab 4 mg 4 mg Oral Q8H PRN   TraZODone HCl (DESYREL) tab 50 mg 50 mg Oral Nightly PRN   Cholecalciferol (VITAMIN D) 1000 units tab 2,000 Units 2,000 Units Oral Daily   atorvastatin (LIPITOR) tab 10 mg 10 mg Oral Nightly   bisacodyl (DULCOLAX (LJX=[id number])    Result Date: 1/23/2018  CONCLUSION:  1. Cholelithiasis. No additional sonographic manifestations of acute cholecystitis. No biliary ductal dilation. 2. Probable fatty liver.  Scattered round/ovoid hypoechoic foci in the left and right hepatic other site [facility])  Oncology and urology eval noted   Not a surgical candidate       Metastatic renal cell carcinoma ([facility] [address]      Hypomagnesemia  On protocol       Leukocytosis, unspecified type  Could be due to renal cell ca   Since pt has worsening of Leucoc

## 2018-01-25 NOTE — CONSULTS
[facility] ID CONSULT NOTE    Roseline Farley Patient Status:  Inpatient    1950 MRN [id number]   Location [facility] 4W/SW/SE Attending [address], 768 [facility] Day # 2 PCP [facility] MD, [address]       Reason Medications:   •  Senna-Docusate Sodium (SENOKOT S) 8.6-50 MG tab 2 tablet, 2 tablet, Oral, BID  •  ondansetron HCl (ZOFRAN) injection 8 mg, 8 mg, Intravenous, 4 times per day  •  Morphine Sulfate (Concentrate) concentrated solution 10 mg, 10 mg, Oral, Q4H tingling in the extremities. MUSCULOSKELETAL:  No muscle, back pain, joint pain or stiffness. HEMATOLOGIC:  No anemia, bleeding or bruising. ALLERGIES:  No history of asthma, hives, eczema or rhinitis.     Physical Exam:  Vital signs: Blood pressure 154/ arrival to the ED. On arrival, afebrile, WBC 16.9, , ALT 78, , T bili 1.0, CXR negative for infiltrates, GB US with cholelithiasis with no wall thickening or ductal dilation, received one dose zosyn in ED.  Underwent CT chest/abd/pelvis showin

## 2018-01-26 NOTE — PROGRESS NOTES
[facility]    Hematology/Oncology   Progress Note    Cierra Gaucher Patient Status:  Inpatient    1950 MRN [id number]   Location [facility] 4W/SW/SE Attending [address], [address] [facility] Day # 2 PCP [facility] MD, Michelle Frey the T8 vertebral body, concerning for osseous metastatic disease. If there are referable symptoms, consider followup thoracic spine MR without and with contrast. 5. Partially imaged heterogeneous left renal mass.  Delayed excretion of contrast from the left heterogeneously enhancing right cardiophrenic lymph node, likely metastatic. 6. Suspicious mixed lytic and sclerotic appearance of the T8 vertebral body, concerning for osseous metastatic disease. 7. Small pericardial effusion. 8. Small hiatal hernia. 9.  Jeraldene Pump

## 2018-01-26 NOTE — PROGRESS NOTES
[facility]    Hematology/Oncology   Progress Note    Ron Seals Patient Status:  Inpatient    1950 MRN [id number]   Location [facility] 4W/SW/SE Attending [address], [address] [facility] Day # 3 PCP [facility] MD, Sundar Fall reflux disease. 8. Lesser incidental findings as above. Ir Biopsy    Result Date: 1/24/2018  CONCLUSION:  1. Successful ultrasound-guided percutaneous core biopsy of liver lesion. Medications reviewed.       Assessment and Plan:  67-year-ol

## 2018-01-26 NOTE — PROGRESS NOTES
[facility] PRO[facility]ESS [facility]    Mary Howard Patient Status:  Inpatient    1950 MRN [id number]   Location [facility] 4W/SW/SE Attending [address], 768 [facility] Day # 3 PCP ANJU GUNDERSON, EDWARD     Subjective:  Awake, states still having RUQ p Describes the pain as sharp and that it comes and goes. Per patient, she had a fever of 101 prior to arrival to the ED.  On arrival, afebrile, WBC 16.9, , ALT 78, , T bili 1.0, CXR negative for infiltrates, GB US with cholelithiasis with no wa

## 2018-01-26 NOTE — CONSULTS
[place] [facility] - [facility]  Palliative Care Follow Up    Kristopher Peter Patient Status:  Inpatient    1950 MRN [id number]   Location [facility] 4W/SW/SE Attending [address], 768 [facility] Day # 3 PCP ANJU GUNDERSON, EDWARD     Date of Consu discharge. She has been instructed to not drive while taking this medication. We discussed that sedation may improve with use. She has also been experiencing sweat around her head for the past few years. There is no soaking of her night gown.     We dis normal    Allergies:  No Known Allergies      Medications:     Current Facility-Administered Medications:   •  Senna-Docusate Sodium (SENOKOT S) 8.6-50 MG tab 2 tablet, 2 tablet, Oral, Once  •  Senna-Docusate Sodium (SENOKOT S) 8.6-50 MG tab 4 tablet, 4 ta 01/25/2018   HGB 9.4 (L) 01/25/2018   HCT 29.0 (L) 01/25/2018    01/25/2018       Coags:    Lab Results  Component Value Date   INR 1.2 01/24/2018       Chemistry:    Lab Results  Component Value Date   CREATSERUM 0.77 01/25/2018   BUN 10 01/25/2018 left in the chart in anticipation of discharge)  -Continue Dulcolax suppository FL daily PRN constipation      Nausea  -Start Zofran ODT 8 mg PO Q 6 hours PRN nausea (script left in the chart in anticipation of discharge)       Goals of care, counseling/di continue medication for now prn  She told us that her BM's are hard and very difficult to pass  [place] placed prescriptions in medical chart    Total 35 minutes including above documented.     Dr Fransisca Ames

## 2018-01-26 NOTE — PLAN OF CARE
Anxiety    • Anxiety is at manageable level Adequate for Discharge        Pain    • Patient's pain/discomfort is manageable Adequate for Discharge        Patient Centered Care    • Patient preferences are identified and integrated in the patient's plan of

## 2018-02-01 PROBLEM — A41.9 SEPSIS, DUE TO UNSPECIFIED ORGANISM: Status: ACTIVE | Noted: 2018-01-01

## 2018-02-01 PROBLEM — R53.81 MALAISE: Status: ACTIVE | Noted: 2018-01-01
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
## 2018-02-01 PROBLEM — J18.9 HAP (HOSPITAL-ACQUIRED PNEUMONIA): Status: ACTIVE | Noted: 2018-01-01

## 2018-02-01 PROBLEM — N30.01 ACUTE CYSTITIS WITH HEMATURIA: Status: ACTIVE | Noted: 2018-01-01

## 2018-02-01 PROBLEM — N17.9 AKI (ACUTE KIDNEY INJURY) (HCC): Status: ACTIVE | Noted: 2018-01-01

## 2018-02-01 PROBLEM — E87.1 HYPONATREMIA: Status: ACTIVE | Noted: 2018-01-01

## 2018-02-01 PROBLEM — A41.9 SEPSIS (HCC): Status: ACTIVE | Noted: 2018-01-01

## 2018-02-01 NOTE — CM/SW NOTE
3:36pm Update- [facility] paperwork copied for the pt/family, copy placed in the chart. Pt selected dtr Jayson Massed of the same address, [phone]. Pt verbalized this plan as another relative had already witnessed the document.     SW met with the pt and

## 2018-02-01 NOTE — CONSULTS
[facility]    Report of Consultation    Eneida Ledesma Patient Status:  Inpatient    1950 MRN [id number]   Location [facility] 2W/SW Attending [address], 768 [facility] Day # 0 PCP Valeria Gamble MD, [address]     Date of Admission 0.9%  NaCl infusion  Intravenous Continuous   ondansetron HCl (ZOFRAN) injection 4 mg 4 mg Intravenous Q4H PRN   morphINE sulfate (PF) 2 MG/ML injection 2 mg 2 mg Intravenous Q3H PRN   Pantoprazole Sodium (PROTONIX) EC tab 40 mg 40 mg Oral QAM AC   [STAR Vitamin D3 1000 units Oral Tab Take 2,000 Units by mouth daily.        Allergies  No Known Allergies    Review of Systems:   GENERAL HEALTH: +weakness, wt loss  SKIN: denies any unusual skin lesions or rashes  EYES: no visual complaints or deficits  HEENT PELVIS IV CONTRAST NO ORAL (ER), 1/23/2018, 10:20.   INDICATIONS: Staging for a renal mass  TECHNIQUE: Multidetector CT images of the chest were obtained without non-ionic intravenous contrast material. Automated exposure control for dose reduction was used vertebral body. Small hemangioma suggested within the left lateral aspect of the T5 vertebral body. There is mild levoscoliosis of the thoracic spine. Demineralization. Loose bodies suggested within the right greater than left subcoracoid bursa.  OTHER: Neg white matter ischemic change in the periventricular white matter bilaterally. CEREBELLUM: No edema, hemorrhage, mass, acute infarction, or significant atrophy. BRAINSTEM: No edema, hemorrhage, mass, acute infarction, or significant atrophy.   CALVARIUM: No biliary ductal dilation. 2. Probable fatty liver. Scattered round/ovoid hypoechoic foci in the left and right hepatic lobes as detailed. These are incompletely characterized.  Differential considerations include foci of focal fatty sparing, complex cysts, o (cpt=71045)    Result Date: 2/1/2018  PROCEDURE: XR CHEST AP PORTABLE (CPT=71010) TIME: 23:17.   COMPARISON: [facility], CT CHEST (CPT=71250), 1/24/2018, 9:06. [facility], XR CHEST AP PORTABLE (CPT=71010), 1/23/2018, 8:51. 1/23/2018 at 9:22          CONCLUSION:  1. Normal heart and lungs. 2. Elevated right hemidiaphragm. 3. Tortuous aorta. 4. Demineralization. 5. Scoliosis.          Ct Abdomen Pelvis Iv Contrast, No Oral (er)    Result Date: 1/23/2018  PROCEDURE: CT ABDOMEN P 15 x 11 x 12 cm (craniocaudal by transverse by AP). The components that extend extrarenally anteriorly and inferiorly with about loops of left upper quadrant small bowel as well as the descending colon.  There is a nonobstructing 2 mm left lower pole renal abut loops of small bowel in the left upper quadrant as well as the descending colon. No evidence of bowel obstruction. 2. Extensive left greater than right para-aortic retroperitoneal lymphadenopathy, likely metastatic.  3. Extensive bilobar hepatic metast hypoechoic lesion within the right lobe of the liver was targeted for percutaneous biopsy via anterolateral, inferior intercostal approach. The overlying subcutaneous soft tissues were anesthetized using 10 mL of 2% lidocaine.   Under ultrasound guidance, t

## 2018-02-01 NOTE — ED PROVIDER NOTES
Patient Seen in: [facility] Emergency Department    History   Patient presents with:  Weakness    Stated Complaint: weakness    HPI    42-year-old female with history of anxiety, depression, hypertension, hyperlipidemia, thyroid disease, recent di negative. Positive for stated complaint: weakness  Other systems are as noted in HPI. Constitutional and vital signs reviewed. All other systems reviewed and negative except as noted above.     Physical Exam   ED Triage Vitals  BP: (!) 86/58 [01/ changes      Cardiac Monitor:    Patient placed on the cardiac monitor and a rhythm strip obtained with the indication of generalized weakness.   Monitor shows regular rhtyhm at a rate of 74 bpm.     My interpretation is   normal for rate   Normal for rhyth % 71 %   Lymphocyte % 2 %   Monocyte % 3 %   Eosinophil % 0 %   Basophil % 0 %   Band % 17 (H) 0-10 % %   Metamyelocyte % 3 %   Myelocyte % 4 %   Nucrbc 1 (H) <1 %   Neutrophil Absolute 35.8 (H) 1.8 - 7.7 K/UL   Lymphocyte Absolute 0.8 (L) 1.0 - 4.0 K/UL been electronically signed and verified by the Radiologist whose name is printed above. DD:  01/31/2018/DT:  02/01/2018      EMERGENCY DEPARTMENT COURSE AND TREATMENT:  Patient's condition was fair during Emergency Department evaluation.      67yoF with Review: I personally reviewed available prior medical records for any recent pertinent discharge summaries, testing, and procedures, and reviewed those reports. Complicating Factors: The patient already has has Metastatic renal cell carcinoma ([facility] [address] [address]  Alberto Aly Admission           ICD-10-CM Noted POA    Sepsis ([facility] [address] [address] A41.9 2/1/2018 Unknown

## 2018-02-01 NOTE — CONSULTS
[facility] ID CONSULT NOTE    [facility] Patient Status:  Inpatient    1950 MRN [id number]   Location [facility] 2W/SW Attending [address], [address] [facility] Day # 0 PCP Judi Carrasquillo MD, [address]       Reason for history. reports that she quit smoking about 4 weeks ago. She smoked 0.50 packs per day. She has never used smokeless tobacco. She reports that she does not drink alcohol.     Allergies:  No Known Allergies    Medications:    Current Facility-Administered numbness or tingling in the extremities. MUSCULOSKELETAL:  No muscle, back pain, joint pain or stiffness. HEMATOLOGIC:  No anemia, bleeding or bruising. ALLERGIES:  No history of asthma, hives, eczema or rhinitis.     Physical Exam:  Vital signs: Blood p found. She now presents to the [address] [place] [address] ED on 1/31 after a near syncopal episode in her bathroom. She states she had had decreased appetite and nausea since she was discharged. Has been having RUQ pain off and on. Denies any dysuria or blood in urine.  No diarrh

## 2018-02-01 NOTE — PROGRESS NOTES
[facility] dosing service    Initial Pharmacokinetic Consult for Vancomycin Dosing     Sayra Welsh is a 74year old female admitted on 1/31 who is being treated for pneumonia.   Pharmacy has been asked to dose Vancomycin by Dr. Zuleima Horton    She has No Status: Completed Lab Status: Final result Updated: 02/01/18 0610   Specimen: Other from Nares     MRSA Screen By PCR Inconclusive   Narrative:     Potentially interfering substances may include, but are not limited to the following: blood, excessive nasal

## 2018-02-01 NOTE — DIETARY NOTE
ADULT NUTRITION INITIAL ASSESSMENT    Pt is at moderate nutrition risk. Pt does not meet malnutrition criteria. RECOMMENDATIONS TO MD: RD to provide supportive nutrition- offer supplements when pt is able/desires to drink/eat.       NUTRITION DIAGNOSI Depression    • Disorder of thyroid    • Essential hypertension    • High blood pressure    • High cholesterol    • Hyperlipidemia    • Osteoarthritis     arthritis in thumbs   • Renal cancer [facility])    • Thyroid disease    • Visual impairment          ANTHR Mass: well nourished per limited visual exam.   - Fluid Accumulation: lower extremity edema +1 per documentation. .- Skin Integrity: intact.  - Ashvin score 18 low skin risk    NUTRITION PRESCRIPTION:  Diet: clear liquids advance as tolerated.    Oral Sup

## 2018-02-01 NOTE — ED INITIAL ASSESSMENT (HPI)
Patient arrived via ems s/p becoming weak while in the bathroom. Patient went to sit on the toilet and missed the seat. Patient denies LOC. Denies injury. Patient does report sob today. Decreased oral intake. Denies nvd.   Denies fever

## 2018-02-01 NOTE — H&P
[facility]    PATIENT'S NAME: Frantz Dewitt   ATTENDING PHYSICIAN: Martha Evans MD   PATIENT ACCOUNT#:   [de-identified]    LOCATION:  [address] [place] [address] #:   [id number]       YOB: 1950  ADMISSION DATE:       01/31/20 5.3.  Sodium 120, potassium 5.2, carbon dioxide 19, BUN 63, creatinine 1.8.  UA showed WBC of 5130 and WBC clumps and bacteria. Blood cultures are pending. ASSESSMENT AND PLAN:    1.    Sepsis secondary to urinary tract infection:  Started on IV meropen

## 2018-02-01 NOTE — PROGRESS NOTES
Vascular Access Note  Inserted by Joan Shaffer RN    Allergies to Lidocaine: no  Allergies to Latex: no  Presence of Pacemaker/Defibrillator: No  Mastectomy with Lymph Node Dissection: No  AV Fistula / AV Graft: No  Dialysis Catheter: No  Central Line: No

## 2018-02-01 NOTE — PLAN OF CARE
Unable to draw Lactic acid. Attempted by 3 phlebotomists.  aware. Ok'd to cancel the repeat lactic level.

## 2018-02-02 NOTE — CONSULTS
[address] Patient Status:  Inpatient    1950 MRN [id number]   Location [facility] 2W/SW Attending [address], [address] [facility] Day # 1 PCP Lili Rowland MD, EDWARD     Date of Co She has a history of newly diagnosis metastatic renal ca with mets to liver/lung/bone, anxiety/depression, HTN, hypercholesterolemia, hypothyroidism and OA. History was obtained from [address] [place] [address] Rd and pt's family.   I was asked by Dr. Ramsey Baez to evaluate patient for pa wife in conference room. I provided education on the differences between palliative care and hospice care. Palliative care handout provided.  I discussed the benefits of palliative care to include assistance with arising symptom management needs, extra laye care with inpt hospice care. I provided brief overview of comfort care/hosipce for their reference. They are NOT ready for hospice right now.  I did talk with them regarding her ongoing non-verbal symptoms of pain/dyspnea and recommended low dose Morphine p mg in sodium chloride 0.9 % 500 mL IVPB, 1,250 mg, Intravenous, Q24H  •  ClonazePAM (KLONOPIN) tab 0.5 mg, 0.5 mg, Oral, BID PRN  •  FLUoxetine HCl (PROZAC) cap 40 mg, 40 mg, Oral, Daily  •  Levothyroxine Sodium (SYNTHROID, LEVOTHROID) tab 88 mcg, 88 mcg, Date: 2/1/2018  CONCLUSION:   Extensive new inflammation within the right lower quadrant with reactive wall thickening of the cecum and the presumed terminal ileum suggestive of severe colitis and terminal ileitis of either inflammatory or infectious etiol bed.  HEENT: No focal deficits. +dry mouth  Cardiac: Regular rate and rhythm, S1, S2 normal, no murmur, rub or gallop. Lungs: Coarse breath sounds bilaterally. Increased respiratory excursions and effort.   Abdomen: Soft, distended, non-tender, normal bow to complete POLST pending clinical course       Palliative Performance Scale 10%  -Sepsis, UTI, PNA, acute respiratory failure, hypotension, encephalopathy, elevated LFT's/liver failure, metastatic renal ca, significant functional/cognitive decline with be

## 2018-02-02 NOTE — PROGRESS NOTES
02/02/18 1543   Clinical Encounter Type   Visited With Family  (Pt intubated and non-responsive)   Continue Visiting Yes   Crisis Visit Critical care  (family just learned that Pt has cancer which has spread)   Patient Spiritual Encounters   Spiritual I

## 2018-02-02 NOTE — PROGRESS NOTES
[facility] ID PROGRESS NOTE    Heather Paz Patient Status:  Inpatient    1950 MRN [id number]   Location [facility] 2W/SW Attending [address], [address] Day # 1 PCP ANJU GUNDERSON, EDWARD     Subjective:  Awake, less responsive/lethargic th dose meropenem     Carlyle Rodriguez is a 74year old female with PMH significant for HTN, HL, hypothyroidism, depression, who was recently diagnosed with metastatic poorly differentiated carcinoma on her last admission to [address] [place] [address] 1/23-1/26, after presenting with 2/1     # HTN  # HL  # Hypothyroidism  # Depression      PLAN:  -  Follow fever curve, wbc. FU repeat CBC/diff.  -  Continue with vancomycin, IV flagyl and cefepime. -  Will order CMV and GI panel. Patient not currently having bowel movements.   -  Review

## 2018-02-02 NOTE — PROGRESS NOTES
[facility]  Gastroenterology Progress Note    Suhail Cornelius Patient Status:  Inpatient    1950 MRN [id number]   Location [facility] 2W/SW Attending [address], [address] [facility] Day # 1 PCP [facility] MD, Tracey Parker     Subjective:  Tong Needs ileitis of either inflammatory or infectious etiology.   There is an elongated thickened tubular structure within the right lower quadrant which is felt to represent an inflamed terminal ileum, however an acutely inflamed appendix is felt less likely but no due to unspecified organism [facility])     Acute cystitis with hematuria     HAP (hospital-acquired pneumonia)     Malaise     Hyponatremia     MARGIE (acute kidney injury) ([facility] [address]     Urinary tract infection without hematuria     Metastatic carcinoma ([facility]      Ass

## 2018-02-02 NOTE — PROGRESS NOTES
02/02/18 1607   Attending [id number] [address]   Attending Physician Notified Y   Attending Physician Name Benigno Chun,    Post Mortem Checklist   Date of Death 02/02/18   Time of Death 46   Pronounced by Nurse   RN Name Alana Schaefer RN

## 2018-02-02 NOTE — CONSULTS
[facility]    PATIENT'S NAME: Rosalind Sanchez   ATTENDING PHYSICIAN: Avinash Heranndez MD   CONSULTING PHYSICIAN: Henry Muse.  MD Daya   PATIENT ACCOUNT#:   [id number]    LOCATION:  [address] [place] [address] RECORD #:   [id number]       DATE OF BIRTH: anxiety, hypothyroidism. MEDICATIONS:  Vitamin D, Prozac, levothyroxine, lisinopril, magnesium, Zofran, Protonix. SOCIAL HISTORY:  The patient is recently a current smoker of about 1 pack per day for more than 30 years.   She denies alcohol or illicit to systemic therapy. The patient is not a candidate for any systemic treatment at this time. Any treatment will be palliative intent. Palliative Care has already met the patient and will have them follow during the inpatient stay.  She is on IV fluids an

## 2018-02-02 NOTE — PLAN OF CARE
Patient/Family Goals    • Patient/Family Long Term Goal Not Progressing    • Patient/Family Short Term Goal Not Progressing          CARDIOVASCULAR - ADULT    • Maintains optimal cardiac output and hemodynamic stability Progressing        PAIN - ADULT    •

## 2018-02-02 NOTE — PLAN OF CARE
[facility] notified - [facility]    Referral number: [id number]

## 2018-02-02 NOTE — PLAN OF CARE
CARDIOVASCULAR - ADULT    • Maintains optimal cardiac output and hemodynamic stability Not Progressing    • Absence of cardiac arrhythmias or at baseline Not Progressing    Hypotensive, frequent PVCs noted.      PAIN - ADULT    • Verbalizes/displays adequat

## 2018-02-02 NOTE — PROGRESS NOTES
[place] [facility]    Progress Note    Makeda Escobar Patient Status:  Inpatient    1950 MRN [id number]   Location [facility] 2W/SW Attending [address], [address] [facility] Day # 1 PCP ANJU GUNDERSON, EDWARD       SUBJECTIVE:  Confused Vancomycin HCl (VANCOCIN) 1,250 mg in sodium chloride 0.9 % 500 mL IVPB 1,250 mg Intravenous Q24H   ClonazePAM (KLONOPIN) tab 0.5 mg 0.5 mg Oral BID PRN   FLUoxetine HCl (PROZAC) cap 40 mg 40 mg Oral Daily   Levothyroxine Sodium (SYNTHROID, LEVOTHROID) t

## 2018-02-03 NOTE — DISCHARGE SUMMARY
[place] [facility] - [facility]    Discharge Summary    Wilmer Vila Patient Status:  Inpatient    1950 MRN [id number]   Location [facility] 2W/SW Attending No att. providers found   Hosp Day # 1 PCP Noelle Dickerson MD, [address] [place] [address]     Date of Admission: 1/3 bilirubin of 10, so diagnosis of sepsis secondary to UTI. Started on IV antibiotics, IV fluids, and admitted to ICU. Hospital Course:     Pt's prognosis is very poor.  Acute deterioration is multifactorial due to sepsis, decremented ca with brain mets, taking on:  2/25/2018  Quantity:  30 mL  Refills:  0     ondansetron 8 MG Tbdp  Commonly known as:  ZOFRAN ODT      Take 1 tablet (8 mg total) by mouth every 6 (six) hours as needed for Nausea.    Quantity:  30 tablet  Refills:  0     Pravastatin Sodium 40

## 2018-02-06 LAB — CYTOMEGALOVIRUS DETECTION, PCR: NOT DETECTED
